# Patient Record
Sex: FEMALE | Race: WHITE | NOT HISPANIC OR LATINO | Employment: OTHER | URBAN - METROPOLITAN AREA
[De-identification: names, ages, dates, MRNs, and addresses within clinical notes are randomized per-mention and may not be internally consistent; named-entity substitution may affect disease eponyms.]

---

## 2021-08-07 ENCOUNTER — HOSPITAL ENCOUNTER (OUTPATIENT)
Facility: HOSPITAL | Age: 71
Discharge: HOME | End: 2021-08-08
Attending: EMERGENCY MEDICINE | Admitting: EMERGENCY MEDICINE
Payer: COMMERCIAL

## 2021-08-07 ENCOUNTER — APPOINTMENT (OUTPATIENT)
Dept: RADIOLOGY | Facility: HOSPITAL | Age: 71
End: 2021-08-07
Attending: EMERGENCY MEDICINE
Payer: COMMERCIAL

## 2021-08-07 DIAGNOSIS — J96.01 ACUTE HYPOXEMIC RESPIRATORY FAILURE (H): Primary | ICD-10-CM

## 2021-08-07 DIAGNOSIS — R09.02 HYPOXIA: ICD-10-CM

## 2021-08-07 DIAGNOSIS — J44.1 COPD EXACERBATION (H): ICD-10-CM

## 2021-08-07 PROBLEM — Z95.5 STENTED CORONARY ARTERY: Status: ACTIVE | Noted: 2019-05-17

## 2021-08-07 PROBLEM — F17.200 SMOKER: Status: ACTIVE | Noted: 2018-05-01

## 2021-08-07 PROBLEM — I25.5 ISCHEMIC CARDIOMYOPATHY: Status: ACTIVE | Noted: 2021-08-07

## 2021-08-07 PROBLEM — G47.33 OSA (OBSTRUCTIVE SLEEP APNEA): Status: ACTIVE | Noted: 2020-10-07

## 2021-08-07 PROBLEM — F31.0: Status: ACTIVE | Noted: 2020-02-25

## 2021-08-07 LAB
ANION GAP SERPL CALCULATED.3IONS-SCNC: 5 MMOL/L (ref 5–18)
BNP SERPL-MCNC: 152 PG/ML (ref 0–120)
BUN SERPL-MCNC: 8 MG/DL (ref 8–28)
C REACTIVE PROTEIN LHE: 0.3 MG/DL (ref 0–0.8)
CALCIUM SERPL-MCNC: 9.3 MG/DL (ref 8.5–10.5)
CHLORIDE BLD-SCNC: 108 MMOL/L (ref 98–107)
CO2 SERPL-SCNC: 28 MMOL/L (ref 22–31)
CREAT SERPL-MCNC: 0.81 MG/DL (ref 0.6–1.1)
ERYTHROCYTE [DISTWIDTH] IN BLOOD BY AUTOMATED COUNT: 13 % (ref 10–15)
GFR SERPL CREATININE-BSD FRML MDRD: 74 ML/MIN/1.73M2
GLUCOSE BLD-MCNC: 152 MG/DL (ref 70–125)
HCT VFR BLD AUTO: 43.2 % (ref 35–47)
HGB BLD-MCNC: 14 G/DL (ref 11.7–15.7)
HOLD SPECIMEN: NORMAL
HOLD SPECIMEN: NORMAL
MCH RBC QN AUTO: 30.4 PG (ref 26.5–33)
MCHC RBC AUTO-ENTMCNC: 32.4 G/DL (ref 31.5–36.5)
MCV RBC AUTO: 94 FL (ref 78–100)
PLATELET # BLD AUTO: 187 10E3/UL (ref 150–450)
POTASSIUM BLD-SCNC: 4.9 MMOL/L (ref 3.5–5)
RBC # BLD AUTO: 4.61 10E6/UL (ref 3.8–5.2)
SARS-COV-2 RNA RESP QL NAA+PROBE: NEGATIVE
SODIUM SERPL-SCNC: 141 MMOL/L (ref 136–145)
TROPONIN I SERPL-MCNC: 0.02 NG/ML (ref 0–0.29)
WBC # BLD AUTO: 6 10E3/UL (ref 4–11)

## 2021-08-07 PROCEDURE — 36415 COLL VENOUS BLD VENIPUNCTURE: CPT | Performed by: STUDENT IN AN ORGANIZED HEALTH CARE EDUCATION/TRAINING PROGRAM

## 2021-08-07 PROCEDURE — 93005 ELECTROCARDIOGRAM TRACING: CPT | Performed by: EMERGENCY MEDICINE

## 2021-08-07 PROCEDURE — 96375 TX/PRO/DX INJ NEW DRUG ADDON: CPT

## 2021-08-07 PROCEDURE — 86141 C-REACTIVE PROTEIN HS: CPT | Performed by: EMERGENCY MEDICINE

## 2021-08-07 PROCEDURE — 85027 COMPLETE CBC AUTOMATED: CPT | Performed by: EMERGENCY MEDICINE

## 2021-08-07 PROCEDURE — 93005 ELECTROCARDIOGRAM TRACING: CPT

## 2021-08-07 PROCEDURE — 250N000009 HC RX 250: Performed by: FAMILY MEDICINE

## 2021-08-07 PROCEDURE — 87635 SARS-COV-2 COVID-19 AMP PRB: CPT | Performed by: EMERGENCY MEDICINE

## 2021-08-07 PROCEDURE — 99285 EMERGENCY DEPT VISIT HI MDM: CPT | Mod: 25

## 2021-08-07 PROCEDURE — 93010 ELECTROCARDIOGRAM REPORT: CPT | Mod: HOP | Performed by: INTERNAL MEDICINE

## 2021-08-07 PROCEDURE — 80048 BASIC METABOLIC PNL TOTAL CA: CPT | Performed by: EMERGENCY MEDICINE

## 2021-08-07 PROCEDURE — 99220 PR INITIAL OBSERVATION CARE,LEVEL III: CPT | Performed by: FAMILY MEDICINE

## 2021-08-07 PROCEDURE — 83880 ASSAY OF NATRIURETIC PEPTIDE: CPT | Performed by: EMERGENCY MEDICINE

## 2021-08-07 PROCEDURE — 250N000011 HC RX IP 250 OP 636: Performed by: EMERGENCY MEDICINE

## 2021-08-07 PROCEDURE — 96374 THER/PROPH/DIAG INJ IV PUSH: CPT

## 2021-08-07 PROCEDURE — 84484 ASSAY OF TROPONIN QUANT: CPT | Performed by: EMERGENCY MEDICINE

## 2021-08-07 PROCEDURE — 120N000001 HC R&B MED SURG/OB

## 2021-08-07 PROCEDURE — 250N000009 HC RX 250: Performed by: EMERGENCY MEDICINE

## 2021-08-07 PROCEDURE — 94640 AIRWAY INHALATION TREATMENT: CPT

## 2021-08-07 PROCEDURE — C9803 HOPD COVID-19 SPEC COLLECT: HCPCS

## 2021-08-07 PROCEDURE — 71046 X-RAY EXAM CHEST 2 VIEWS: CPT

## 2021-08-07 PROCEDURE — 250N000011 HC RX IP 250 OP 636: Performed by: FAMILY MEDICINE

## 2021-08-07 PROCEDURE — 250N000013 HC RX MED GY IP 250 OP 250 PS 637: Performed by: FAMILY MEDICINE

## 2021-08-07 RX ORDER — SPIRONOLACTONE 25 MG/1
25 TABLET ORAL DAILY
Status: DISCONTINUED | OUTPATIENT
Start: 2021-08-08 | End: 2021-08-08 | Stop reason: HOSPADM

## 2021-08-07 RX ORDER — ALBUTEROL SULFATE 0.83 MG/ML
5 SOLUTION RESPIRATORY (INHALATION) ONCE
Status: COMPLETED | OUTPATIENT
Start: 2021-08-07 | End: 2021-08-07

## 2021-08-07 RX ORDER — NITROGLYCERIN 0.4 MG/1
0.4 TABLET SUBLINGUAL EVERY 5 MIN PRN
COMMUNITY

## 2021-08-07 RX ORDER — FAMOTIDINE 20 MG/1
20 TABLET, FILM COATED ORAL 2 TIMES DAILY
Status: DISCONTINUED | OUTPATIENT
Start: 2021-08-07 | End: 2021-08-08 | Stop reason: HOSPADM

## 2021-08-07 RX ORDER — SUCRALFATE 1 G/1
1 TABLET ORAL AT BEDTIME
Status: DISCONTINUED | OUTPATIENT
Start: 2021-08-07 | End: 2021-08-08 | Stop reason: HOSPADM

## 2021-08-07 RX ORDER — BUDESONIDE AND FORMOTEROL FUMARATE DIHYDRATE 80; 4.5 UG/1; UG/1
2 AEROSOL RESPIRATORY (INHALATION) 2 TIMES DAILY
Status: DISCONTINUED | OUTPATIENT
Start: 2021-08-07 | End: 2021-08-08 | Stop reason: HOSPADM

## 2021-08-07 RX ORDER — ACETAMINOPHEN 325 MG/1
325-650 TABLET ORAL EVERY 6 HOURS PRN
COMMUNITY

## 2021-08-07 RX ORDER — SOTALOL HYDROCHLORIDE 80 MG/1
80 TABLET ORAL 2 TIMES DAILY
COMMUNITY

## 2021-08-07 RX ORDER — ALBUTEROL SULFATE 90 UG/1
2 AEROSOL, METERED RESPIRATORY (INHALATION) EVERY 6 HOURS
COMMUNITY

## 2021-08-07 RX ORDER — CARVEDILOL 25 MG/1
25 TABLET ORAL 2 TIMES DAILY WITH MEALS
COMMUNITY

## 2021-08-07 RX ORDER — LANOLIN ALCOHOL/MO/W.PET/CERES
2000 CREAM (GRAM) TOPICAL DAILY
Status: DISCONTINUED | OUTPATIENT
Start: 2021-08-08 | End: 2021-08-08 | Stop reason: HOSPADM

## 2021-08-07 RX ORDER — EZETIMIBE 10 MG/1
10 TABLET ORAL DAILY
COMMUNITY

## 2021-08-07 RX ORDER — DEXTROSE MONOHYDRATE 25 G/50ML
25-50 INJECTION, SOLUTION INTRAVENOUS
Status: DISCONTINUED | OUTPATIENT
Start: 2021-08-07 | End: 2021-08-08 | Stop reason: HOSPADM

## 2021-08-07 RX ORDER — EZETIMIBE 10 MG/1
10 TABLET ORAL AT BEDTIME
Status: DISCONTINUED | OUTPATIENT
Start: 2021-08-07 | End: 2021-08-08 | Stop reason: HOSPADM

## 2021-08-07 RX ORDER — WARFARIN SODIUM 5 MG/1
5 TABLET ORAL SEE ADMIN INSTRUCTIONS
COMMUNITY

## 2021-08-07 RX ORDER — PANTOPRAZOLE SODIUM 40 MG/1
1 FOR SUSPENSION ORAL DAILY
COMMUNITY

## 2021-08-07 RX ORDER — FUROSEMIDE 10 MG/ML
20 INJECTION INTRAMUSCULAR; INTRAVENOUS ONCE
Status: COMPLETED | OUTPATIENT
Start: 2021-08-07 | End: 2021-08-07

## 2021-08-07 RX ORDER — LEVOTHYROXINE SODIUM 137 UG/1
137 TABLET ORAL DAILY
COMMUNITY

## 2021-08-07 RX ORDER — SPIRONOLACTONE 25 MG/1
25 TABLET ORAL DAILY
COMMUNITY

## 2021-08-07 RX ORDER — SOTALOL HYDROCHLORIDE 80 MG/1
80 TABLET ORAL 2 TIMES DAILY
Status: DISCONTINUED | OUTPATIENT
Start: 2021-08-07 | End: 2021-08-08 | Stop reason: HOSPADM

## 2021-08-07 RX ORDER — WARFARIN SODIUM 5 MG/1
5 TABLET ORAL SEE ADMIN INSTRUCTIONS
Status: DISCONTINUED | OUTPATIENT
Start: 2021-08-07 | End: 2021-08-07

## 2021-08-07 RX ORDER — NICOTINE POLACRILEX 4 MG
15-30 LOZENGE BUCCAL
Status: DISCONTINUED | OUTPATIENT
Start: 2021-08-07 | End: 2021-08-08 | Stop reason: HOSPADM

## 2021-08-07 RX ORDER — IPRATROPIUM BROMIDE AND ALBUTEROL SULFATE 2.5; .5 MG/3ML; MG/3ML
1 SOLUTION RESPIRATORY (INHALATION) EVERY 6 HOURS PRN
COMMUNITY

## 2021-08-07 RX ORDER — ATORVASTATIN CALCIUM 40 MG/1
40 TABLET, FILM COATED ORAL AT BEDTIME
Status: DISCONTINUED | OUTPATIENT
Start: 2021-08-07 | End: 2021-08-08 | Stop reason: HOSPADM

## 2021-08-07 RX ORDER — ALBUTEROL SULFATE 0.83 MG/ML
2.5 SOLUTION RESPIRATORY (INHALATION)
Status: DISCONTINUED | OUTPATIENT
Start: 2021-08-07 | End: 2021-08-08 | Stop reason: HOSPADM

## 2021-08-07 RX ORDER — VITAMIN B COMPLEX
50 TABLET ORAL DAILY
Status: DISCONTINUED | OUTPATIENT
Start: 2021-08-08 | End: 2021-08-08 | Stop reason: HOSPADM

## 2021-08-07 RX ORDER — DULOXETIN HYDROCHLORIDE 20 MG/1
20 CAPSULE, DELAYED RELEASE ORAL DAILY
COMMUNITY

## 2021-08-07 RX ORDER — QUETIAPINE FUMARATE 100 MG/1
400 TABLET, FILM COATED ORAL AT BEDTIME
Status: DISCONTINUED | OUTPATIENT
Start: 2021-08-07 | End: 2021-08-08 | Stop reason: HOSPADM

## 2021-08-07 RX ORDER — NITROGLYCERIN 0.4 MG/1
0.4 TABLET SUBLINGUAL EVERY 5 MIN PRN
Status: DISCONTINUED | OUTPATIENT
Start: 2021-08-07 | End: 2021-08-08 | Stop reason: HOSPADM

## 2021-08-07 RX ORDER — LISINOPRIL 5 MG/1
10 TABLET ORAL DAILY
COMMUNITY

## 2021-08-07 RX ORDER — BUDESONIDE AND FORMOTEROL FUMARATE DIHYDRATE 80; 4.5 UG/1; UG/1
2 AEROSOL RESPIRATORY (INHALATION) 2 TIMES DAILY
COMMUNITY

## 2021-08-07 RX ORDER — OXYBUTYNIN CHLORIDE 10 MG/1
10 TABLET, EXTENDED RELEASE ORAL DAILY
Status: DISCONTINUED | OUTPATIENT
Start: 2021-08-07 | End: 2021-08-08 | Stop reason: HOSPADM

## 2021-08-07 RX ORDER — ZOLPIDEM TARTRATE 5 MG/1
5 TABLET ORAL
COMMUNITY

## 2021-08-07 RX ORDER — OXYBUTYNIN CHLORIDE 10 MG/1
10 TABLET, EXTENDED RELEASE ORAL DAILY
COMMUNITY

## 2021-08-07 RX ORDER — ATORVASTATIN CALCIUM 40 MG/1
40 TABLET, FILM COATED ORAL DAILY
COMMUNITY

## 2021-08-07 RX ORDER — LISINOPRIL 5 MG/1
10 TABLET ORAL DAILY
Status: DISCONTINUED | OUTPATIENT
Start: 2021-08-08 | End: 2021-08-08 | Stop reason: HOSPADM

## 2021-08-07 RX ORDER — PREDNISONE 20 MG/1
20 TABLET ORAL 2 TIMES DAILY WITH MEALS
Status: DISCONTINUED | OUTPATIENT
Start: 2021-08-08 | End: 2021-08-08 | Stop reason: HOSPADM

## 2021-08-07 RX ORDER — LEVOTHYROXINE SODIUM 137 UG/1
137 TABLET ORAL
Status: DISCONTINUED | OUTPATIENT
Start: 2021-08-08 | End: 2021-08-08 | Stop reason: HOSPADM

## 2021-08-07 RX ORDER — PANTOPRAZOLE SODIUM 20 MG/1
40 TABLET, DELAYED RELEASE ORAL DAILY
Status: DISCONTINUED | OUTPATIENT
Start: 2021-08-08 | End: 2021-08-08 | Stop reason: HOSPADM

## 2021-08-07 RX ORDER — ACETAMINOPHEN 325 MG/1
325-650 TABLET ORAL EVERY 6 HOURS PRN
Status: DISCONTINUED | OUTPATIENT
Start: 2021-08-07 | End: 2021-08-08 | Stop reason: HOSPADM

## 2021-08-07 RX ORDER — CARVEDILOL 12.5 MG/1
25 TABLET ORAL 2 TIMES DAILY WITH MEALS
Status: DISCONTINUED | OUTPATIENT
Start: 2021-08-07 | End: 2021-08-08 | Stop reason: HOSPADM

## 2021-08-07 RX ORDER — QUETIAPINE FUMARATE 400 MG/1
400 TABLET, FILM COATED ORAL AT BEDTIME
COMMUNITY

## 2021-08-07 RX ORDER — SUCRALFATE 1 G/1
1 TABLET ORAL DAILY
COMMUNITY

## 2021-08-07 RX ORDER — LIDOCAINE 40 MG/G
CREAM TOPICAL
Status: DISCONTINUED | OUTPATIENT
Start: 2021-08-07 | End: 2021-08-08 | Stop reason: HOSPADM

## 2021-08-07 RX ORDER — DULOXETIN HYDROCHLORIDE 20 MG/1
20 CAPSULE, DELAYED RELEASE ORAL DAILY
Status: DISCONTINUED | OUTPATIENT
Start: 2021-08-08 | End: 2021-08-08 | Stop reason: HOSPADM

## 2021-08-07 RX ORDER — ZOLPIDEM TARTRATE 5 MG/1
5 TABLET ORAL
Status: DISCONTINUED | OUTPATIENT
Start: 2021-08-07 | End: 2021-08-08 | Stop reason: HOSPADM

## 2021-08-07 RX ORDER — METHYLPREDNISOLONE SODIUM SUCCINATE 125 MG/2ML
125 INJECTION, POWDER, LYOPHILIZED, FOR SOLUTION INTRAMUSCULAR; INTRAVENOUS ONCE
Status: COMPLETED | OUTPATIENT
Start: 2021-08-07 | End: 2021-08-07

## 2021-08-07 RX ORDER — FAMOTIDINE 20 MG/1
20 TABLET, FILM COATED ORAL 2 TIMES DAILY
COMMUNITY

## 2021-08-07 RX ADMIN — ALBUTEROL SULFATE 5 MG: 2.5 SOLUTION RESPIRATORY (INHALATION) at 13:42

## 2021-08-07 RX ADMIN — QUETIAPINE FUMARATE 400 MG: 100 TABLET, FILM COATED ORAL at 21:08

## 2021-08-07 RX ADMIN — Medication 1 MG: at 21:08

## 2021-08-07 RX ADMIN — SOTALOL HYDROCHLORIDE 80 MG: 80 TABLET ORAL at 20:12

## 2021-08-07 RX ADMIN — ALBUTEROL SULFATE 5 MG: 2.5 SOLUTION RESPIRATORY (INHALATION) at 17:37

## 2021-08-07 RX ADMIN — BUDESONIDE AND FORMOTEROL FUMARATE DIHYDRATE 2 PUFF: 80; 4.5 AEROSOL RESPIRATORY (INHALATION) at 20:14

## 2021-08-07 RX ADMIN — CARVEDILOL 25 MG: 12.5 TABLET, FILM COATED ORAL at 18:20

## 2021-08-07 RX ADMIN — FAMOTIDINE 20 MG: 20 TABLET, FILM COATED ORAL at 20:13

## 2021-08-07 RX ADMIN — ATORVASTATIN CALCIUM 40 MG: 40 TABLET, FILM COATED ORAL at 20:13

## 2021-08-07 RX ADMIN — EZETIMIBE 10 MG: 10 TABLET ORAL at 20:12

## 2021-08-07 RX ADMIN — SUCRALFATE 1 G: 1 TABLET ORAL at 20:12

## 2021-08-07 RX ADMIN — METHYLPREDNISOLONE SODIUM SUCCINATE 125 MG: 125 INJECTION, POWDER, FOR SOLUTION INTRAMUSCULAR; INTRAVENOUS at 13:59

## 2021-08-07 RX ADMIN — FUROSEMIDE 20 MG: 10 INJECTION, SOLUTION INTRAMUSCULAR; INTRAVENOUS at 18:20

## 2021-08-07 RX ADMIN — ZOLPIDEM TARTRATE 5 MG: 5 TABLET ORAL at 21:08

## 2021-08-07 ASSESSMENT — MIFFLIN-ST. JEOR: SCORE: 1611.77

## 2021-08-07 ASSESSMENT — ACTIVITIES OF DAILY LIVING (ADL): DEPENDENT_IADLS:: INDEPENDENT

## 2021-08-07 NOTE — PHARMACY-ANTICOAGULATION SERVICE
Clinical Pharmacy - Warfarin Dosing Consult     Pharmacy has been consulted to manage this patient s warfarin therapy.  Indication: Atrial Fibrillation  Therapy Goal: INR 2-3  Provider/Team: /Hospitalist  Warfarin Prior to Admission: Yes  Warfarin PTA Regimen: Take 5 mg on Sunday and Tuesday and 7.5 mg all other days  Recent documented change in oral intake/nutrition: Unknown  Dose Comments: No INR today but dose already taken PTA. INR ordered for tomorrow    No results found for: INR, [id number], F2    Warfarin dose has already been taken tonight prior to admission.   Pharmacy will monitor Fatemeh Pablo daily and order warfarin doses to achieve specified goal.      Please contact pharmacy as soon as possible if the warfarin needs to be held for a procedure or if the warfarin goals change.

## 2021-08-07 NOTE — H&P
[facility]    History and Physical - Hospitalist Service       Date of Admission:  8/7/2021    Assessment & Plan      Fatemeh Pablo is a 70 year old female admitted on 8/7/2021. She presents with recurrence of shortness of breath and coughing since her recent hospitalization for COPD exacerbation.  She has not been able to follow-up with pulmonology.  She is not on prednisone.  And her albuterol does not alleviate symptoms for more than 2 hours.    Acute COPD exacerbation: Solu-Medrol given in ED, will start prednisone 20 mg p.o. twice daily she should likely taper this slowly until she gets to her outpatient follow-up with pulmonology on the 27th.  Continue to offer albuterol nebs every 2 hours as needed.  She discussed need for home oxygen but I explained that she can likely control symptoms with home prednisone until follow-up.    Ischemic cardiomyopathy: She describes her symptoms of orthopnea and PND.  Troponin is negative.  -With ejection fraction recently measured below 35%.  Patient has automatic defibrillator.  -We will give 1 dose of Lasix overnight and monitor for improvement of symptoms.    Atrial fibrillation: Patient is currently rate controlled on Coumadin.    Type 2 diabetes: We will start sliding scale while she is getting prednisone.    Bipolar disorder: Continue home medications.    Obesity:Body mass index is 34.97 kg/m .       Diet:  Diabetic diet  DVT Prophylaxis: Warfarin  Moya Catheter: Not present  Central Lines: None  Code Status:  Full code      Disposition Plan   Expected discharge: recommended to prior living arrangement once O2 use less than 1 liters/minute.     The patient's care was discussed with the Bedside Nurse and Patient.    Willem Lemus MD  [facility]  Securely message with the Vocera Web Console (learn more here)  Text page via [employer]  Paging/Directory      ______________________________________________________________________    Chief Complaint   Dyspnea cough    History is obtained from the patient    History of Present Illness   Fatemeh Pablo is a 70 year old female with a pertinent history of COPD, diabetes mellitus II,  who presents to this ED via EMS for evaluation of shortness of breath.      Patient presents with shortness of breath that has been worsening for the past 3 days. The shortness of breath has gotten to the point where she can't walk to the bathroom or speak without becoming short of breath. She has an associated cough and laying flat tends to exacerbates the shortness of breath. Patient was discharged from [facility] a couple of weeks ago after being treated for pneumonia. She has since been using albuterol breathing treatments as needed which helped initially. However, over the past 1-2 days the breathing treatments have not provided any improvement. Her home O2 stats dropped to 87% over the past few days. She requested supplemental home oxygen at her previous hospitalization however, her insurance would not cover this. Patient's most recent EF was at 25% and she was told she has COPD. She does continue to smoke tobacco and she is vaccinated for COVID-19.   Denies chest pain, chest pressure, lower extremity pain or swelling, or any additional symptoms at this time    Review of Systems    The 10 point Review of Systems is negative other than noted in the HPI or here.     Past Medical History    I have reviewed this patient's medical history and updated it with pertinent information if needed.   No past medical history on file.    Past Surgical History   I have reviewed this patient's surgical history and updated it with pertinent information if needed.  No past surgical history on file.    Social History   I have reviewed this patient's social history and updated it with pertinent information if needed.  Social History      Tobacco Use     Smoking status: Not on file   Substance Use Topics     Alcohol use: Not on file     Drug use: Not on file       Family History   No significant family history, including no history of: Medical conditions relevant to CC    Prior to Admission Medications   Prior to Admission Medications   Prescriptions Last Dose Informant Patient Reported? Taking?   DULoxetine (CYMBALTA) 20 MG capsule 8/7/2021 at am  Yes Yes   Sig: Take 20 mg by mouth daily    QUEtiapine (SEROQUEL) 400 MG tablet 8/6/2021 at pm  Yes Yes   Sig: Take 400 mg by mouth At Bedtime   acetaminophen (TYLENOL) 325 MG tablet Past Week at Unknown time  Yes Yes   Sig: Take 325-650 mg by mouth every 6 hours as needed for mild pain   albuterol (PROAIR HFA/PROVENTIL HFA/VENTOLIN HFA) 108 (90 Base) MCG/ACT inhaler Past Week at Unknown time  Yes Yes   Sig: Inhale 2 puffs into the lungs every 6 hours   atorvastatin (LIPITOR) 40 MG tablet 8/6/2021 at pm  Yes Yes   Sig: Take 40 mg by mouth daily   budesonide-formoterol (SYMBICORT) 80-4.5 MCG/ACT Inhaler 8/6/2021 at pm  Yes Yes   Sig: Inhale 2 puffs into the lungs 2 times daily   carvedilol (COREG) 25 MG tablet 8/7/2021 at am  Yes Yes   Sig: Take 25 mg by mouth 2 times daily (with meals)   cholecalciferol (VITAMIN D3) 25 mcg (1000 units) capsule 8/7/2021 at am  Yes Yes   Sig: Take 2 capsules by mouth daily   ezetimibe (ZETIA) 10 MG tablet 8/6/2021 at pm  Yes Yes   Sig: Take 10 mg by mouth daily   famotidine (PEPCID) 20 MG tablet 8/7/2021 at am  Yes Yes   Sig: Take 20 mg by mouth 2 times daily   ipratropium - albuterol 0.5 mg/2.5 mg/3 mL (DUONEB) 0.5-2.5 (3) MG/3ML neb solution 8/7/2021 at am  Yes Yes   Sig: Take 1 vial by nebulization every 6 hours as needed for shortness of breath / dyspnea or wheezing   levothyroxine (SYNTHROID/LEVOTHROID) 137 MCG tablet 8/7/2021 at am  Yes Yes   Sig: Take 137 mcg by mouth daily   lisinopril (ZESTRIL) 5 MG tablet 8/7/2021 at am  Yes Yes   Sig: Take 10 mg by mouth daily    nitroGLYcerin (NITROSTAT) 0.4 MG sublingual tablet Past Week at Unknown time  Yes Yes   Sig: Place 0.4 mg under the tongue every 5 minutes as needed for chest pain For chest pain place 1 tablet under the tongue every 5 minutes for 3 doses. If symptoms persist 5 minutes after 1st dose call 911.   oxybutynin ER (DITROPAN-XL) 10 MG 24 hr tablet 8/7/2021 at am  Yes Yes   Sig: Take 10 mg by mouth daily   pantoprazole sodium (PROTONIX) 40 MG packet 8/7/2021 at am  Yes Yes   Sig: Take 1 packet by mouth daily   sotalol (BETAPACE) 80 MG tablet 8/7/2021 at am  Yes Yes   Sig: Take 80 mg by mouth 2 times daily   spironolactone (ALDACTONE) 25 MG tablet 8/7/2021 at am  Yes Yes   Sig: Take 25 mg by mouth daily   sucralfate (CARAFATE) 1 GM tablet 8/6/2021 at pm  Yes Yes   Sig: Take 1 g by mouth daily   vitamin B-12 (CYANOCOBALAMIN) 2000 MCG tablet 8/7/2021 at am  Yes Yes   Sig: Take 2,000 mcg by mouth daily   warfarin ANTICOAGULANT (COUMADIN) 5 MG tablet 8/7/2021 at pm  Yes Yes   Sig: Take 5 mg by mouth See Admin Instructions Take 5 mg on Sunday and Tuesday and 7.5 mg all other days   zolpidem (AMBIEN) 5 MG tablet 8/6/2021 at pm  Yes Yes   Sig: Take 5 mg by mouth nightly as needed for sleep      Facility-Administered Medications: None     Allergies   No Known Allergies    Physical Exam   Vital Signs: Temp: 98  F (36.7  C) Temp src: Oral BP: (!) 144/63 Pulse: 64   Resp: 28 SpO2: 96 % O2 Device: Nasal cannula Oxygen Delivery: 3 LPM  Weight: 230 lbs 0 oz    General Appearance: Alert dyspneic  Eyes: Anicteric  HEENT: Membranes dry  Respiratory: Diffuse rhonchi and expiratory wheezes  Cardiovascular: Regular rate and rhythm  GI: Bowel sounds present  Lymph/Hematologic: Nonpallorous  Genitourinary: Not examined  Skin: Pale  Musculoskeletal: No joint swelling  Neurologic: Moves upper and lower extremities equally no tremor  Psychiatric: Thought content and orders normal.    Data   Data reviewed today: I reviewed all medications, new  labs and imaging results over the last 24 hours. I personally reviewed EKG and chest imaging  Recent Labs   Lab 08/07/21  1247   WBC 6.0   HGB 14.0   MCV 94         POTASSIUM 4.9   CHLORIDE 108*   CO2 28   BUN 8   CR 0.81   ANIONGAP 5   LAURITA 9.3   *     Most Recent 3 CBC's:Recent Labs   Lab Test 08/07/21  1247   WBC 6.0   HGB 14.0   MCV 94        Recent Results (from the past 24 hour(s))   XR Chest 2 Views    Narrative    EXAM: XR CHEST 2 VW  LOCATION: [facility]  DATE/TIME: 8/7/2021 1:05 PM    INDICATION: Dyspnea.  COMPARISON: 6/10/2021.      Impression    IMPRESSION: No change in dual-lead AICD. Heart and mediastinal size are normal. Stable scarring within the lingula. No new airspace opacities. No pleural effusion or pneumothorax.

## 2021-08-07 NOTE — CONSULTS
"Care Management Initial Consult    General Information  Assessment completed with: Fatemeh Michelle  Type of CM/SW Visit: Initial Assessment    Primary Care Provider verified and updated as needed: Yes   Readmission within the last 30 days: previous discharge plan unsuccessful (Admitted to [facility] on 7/14/21.)   Return Category: Exacerbation of disease  Reason for Consult: discharge planning  Advance Care Planning: Advance Care Planning Reviewed: other (comment) (\"I don't have one\")          Communication Assessment  Patient's communication style: spoken language (English or Bilingual)    Hearing Difficulty or Deaf: no   Wear Glasses or Blind: no    Cognitive  Cognitive/Neuro/Behavioral: WDL                      Living Environment:   People in home: alone     Current living Arrangements: apartment      Able to return to prior arrangements: yes       Family/Social Support:  Care provided by: self  Provides care for: no one  Marital Status:   Children          Description of Support System:           Current Resources:   Patient receiving home care services: No     Community Resources: None  Equipment currently used at home: none  Supplies currently used at home: Nebulizer tubing, Other (\"glasses\")    Employment/Financial:  Employment Status: retired        Financial Concerns:     Referral to Financial Counselor: No       Lifestyle & Psychosocial Needs:  Social Determinants of Health     Tobacco Use:      Smoking Tobacco Use:      Smokeless Tobacco Use:    Alcohol Use:      Frequency of Alcohol Consumption:      Average Number of Drinks:      Frequency of Binge Drinking:    Financial Resource Strain:      Difficulty of Paying Living Expenses:    Food Insecurity:      Worried About Running Out of Food in the Last Year:      Ran Out of Food in the Last Year:    Transportation Needs:      Lack of Transportation (Medical):      Lack of Transportation (Non-Medical):    Physical Activity:      Days of Exercise per " "Week:      Minutes of Exercise per Session:    Stress:      Feeling of Stress :    Social Connections:      Frequency of Communication with Friends and Family:      Frequency of Social Gatherings with Friends and Family:      Attends Spiritism Services:      Active Member of Clubs or Organizations:      Attends Club or Organization Meetings:      Marital Status:    Intimate Partner Violence:      Fear of Current or Ex-Partner:      Emotionally Abused:      Physically Abused:      Sexually Abused:    Depression:      PHQ-2 Score:    Housing Stability:      Unable to Pay for Housing in the Last Year:      Number of Places Lived in the Last Year:      Unstable Housing in the Last Year:        Functional Status:  Prior to admission patient needed assistance:   Dependent ADLs::  (see notes for ambulation)  Dependent IADLs:: Independent  Assesssment of Functional Status: Not at baseline with mobility    Mental Health Status:          Chemical Dependency Status:                Values/Beliefs:  Spiritual, Cultural Beliefs, Spiritism Practices, Values that affect care:                 Additional Information:  Fatemeh lives in an apartment alone. She is independent with ADLs at baseline.    She states, \"I was just at [facility] and admitted 7/14/21. They did an O2 eval and I passed on room air so they did not order oxygen at home and I know that is why I am back. I need oxygen at home.\"    She \"does not have a walker or cane at home. With the increased trouble breathing I have been pushing around a shopping cart in the hays at my apartment building to be able to walk around\".    May need Home O2 if she qualifies.    Family to transport at discharge.    Jen Johnson RN      "

## 2021-08-07 NOTE — PHARMACY-ADMISSION MEDICATION HISTORY
Pharmacy Note - Admission Medication History    Pertinent Provider Information: None     ______________________________________________________________________    Prior To Admission (PTA) med list completed and updated in EMR.       PTA Med List   Medication Sig Last Dose     acetaminophen (TYLENOL) 325 MG tablet Take 325-650 mg by mouth every 6 hours as needed for mild pain Past Week at Unknown time     albuterol (PROAIR HFA/PROVENTIL HFA/VENTOLIN HFA) 108 (90 Base) MCG/ACT inhaler Inhale 2 puffs into the lungs every 6 hours Past Week at Unknown time     atorvastatin (LIPITOR) 40 MG tablet Take 40 mg by mouth daily 8/6/2021 at pm     budesonide-formoterol (SYMBICORT) 80-4.5 MCG/ACT Inhaler Inhale 2 puffs into the lungs 2 times daily 8/6/2021 at pm     carvedilol (COREG) 25 MG tablet Take 25 mg by mouth 2 times daily (with meals) 8/7/2021 at am     cholecalciferol (VITAMIN D3) 25 mcg (1000 units) capsule Take 2 capsules by mouth daily 8/7/2021 at am     DULoxetine (CYMBALTA) 20 MG capsule Take 20 mg by mouth daily  8/7/2021 at am     ezetimibe (ZETIA) 10 MG tablet Take 10 mg by mouth daily 8/6/2021 at pm     famotidine (PEPCID) 20 MG tablet Take 20 mg by mouth 2 times daily 8/7/2021 at am     ipratropium - albuterol 0.5 mg/2.5 mg/3 mL (DUONEB) 0.5-2.5 (3) MG/3ML neb solution Take 1 vial by nebulization every 6 hours as needed for shortness of breath / dyspnea or wheezing 8/7/2021 at am     levothyroxine (SYNTHROID/LEVOTHROID) 137 MCG tablet Take 137 mcg by mouth daily 8/7/2021 at am     lisinopril (ZESTRIL) 5 MG tablet Take 10 mg by mouth daily 8/7/2021 at am     nitroGLYcerin (NITROSTAT) 0.4 MG sublingual tablet Place 0.4 mg under the tongue every 5 minutes as needed for chest pain For chest pain place 1 tablet under the tongue every 5 minutes for 3 doses. If symptoms persist 5 minutes after 1st dose call 911. Past Week at Unknown time     oxybutynin ER (DITROPAN-XL) 10 MG 24 hr tablet Take 10 mg by mouth daily  8/7/2021 at am     pantoprazole sodium (PROTONIX) 40 MG packet Take 1 packet by mouth daily 8/7/2021 at am     QUEtiapine (SEROQUEL) 400 MG tablet Take 400 mg by mouth At Bedtime 8/6/2021 at pm     sotalol (BETAPACE) 80 MG tablet Take 80 mg by mouth 2 times daily 8/7/2021 at am     spironolactone (ALDACTONE) 25 MG tablet Take 25 mg by mouth daily 8/7/2021 at am     sucralfate (CARAFATE) 1 GM tablet Take 1 g by mouth daily 8/6/2021 at pm     vitamin B-12 (CYANOCOBALAMIN) 2000 MCG tablet Take 2,000 mcg by mouth daily 8/7/2021 at am     warfarin ANTICOAGULANT (COUMADIN) 5 MG tablet Take 5 mg by mouth See Admin Instructions Take 5 mg on Sunday and Tuesday and 7.5 mg all other days 8/7/2021 at pm     zolpidem (AMBIEN) 5 MG tablet Take 5 mg by mouth nightly as needed for sleep 8/6/2021 at pm       Information source(s): Patient and [facility]  Method of interview communication: in-person    Summary of Changes to PTA Med List  New: ALL    Patient was asked about OTC/herbal products specifically.  PTA med list reflects this.    In the past week, patient estimated taking medication this percent of the time:  greater than 90%.    Allergies were reviewed, assessed, and updated with the patient.      Medications available for use during hospital stay: albuterol inhaler (Symbicort is NOT available from home).     The information provided in this note is only as accurate as the sources available at the time of the update(s).    Thank you for the opportunity to participate in the care of this patient.    Ree Aguilera  8/7/2021 3:18 PM

## 2021-08-07 NOTE — ED TRIAGE NOTES
Pt dx with pneumonia 2.5 weeks ago with cough.  Pt discharge.  Pt discharge 2 weeks ago.  Pt state sats 94% at home and have been dropping the past few days to 88%. Pt has not smoked x 2 weeks. Pt sats in triage 91%.  Pt put on 2L/NC in triage,  Sats 94%.

## 2021-08-07 NOTE — ED PROVIDER NOTES
eMERGENCY dEPARTMENT PROGRESS NOTE         ED COURSE AND MEDICAL DECISION MAKING  Patient was signed out to me by Dr. Starr at 4:13 PM      Fatemeh Pablo is a 70 year old female who presents with shortness of breath consistent with prior COPD exacerbations.  She had no significant abnormalities on her chest x-ray or lab imaging.  She is borderline hypoxic.  She will be brought into the hospital.  Dr. Lemus is the admitting physician and agreed with the plan.    LAB  Pertinent labs results reviewed   Labs Ordered and Resulted from Time of ED Arrival Up to the Time of Departure from the ED   BASIC METABOLIC PANEL - Abnormal; Notable for the following components:       Result Value    Chloride 108 (*)     Glucose 152 (*)     All other components within normal limits   CBC WITH PLATELETS - Normal   TROPONIN I - Normal   CRP INFLAMMATION - Normal   SARS-COV2 (COVID-19) VIRUS RT-PCR - Normal    Narrative:     Testing was performed using the ke  SARS-CoV-2 & Influenza A/B Assay on the ke  [employer]  System.  This test should be ordered for the detection of SARS-COV-2 in individuals who meet SARS-CoV-2 clinical and/or epidemiological criteria. Test performance is unknown in asymptomatic patients.  This test is for in vitro diagnostic use under the FDA EUA for laboratories certified under CLIA to perform moderate and/or high complexity testing. This test has not been FDA cleared or approved.  A negative test does not rule out the presence of PCR inhibitors in the specimen or target RNA in concentration below the limit of detection for the assay. The possibility of a false negative should be considered if the patient's recent exposure or clinical presentation suggests COVID-19.  [facility] are certified under the Clinical Laboratory Improvement Amendments of 1988 (CLIA-88) as qualified to perform moderate and/or high complexity laboratory testing.   EXTRA BLUE TOP TUBE   EXTRA RED TOP TUBE   B-TYPE  NATRIURETIC PEPTIDE ([facility] ONLY)   PERIPHERAL IV CATHETER   PULSE OXIMETRY NURSING   CARDIAC CONTINUOUS MONITORING   CALL   COVID-19 VIRUS (CORONAVIRUS) BY PCR    Narrative:     The following orders were created for panel order Symptomatic COVID-19 Virus (Coronavirus) by PCR Nasopharyngeal.  Procedure                               Abnormality         Status                     ---------                               -----------         ------                     SARS-COV2 (COVID-19) Vir...[[id number]]  Normal              Final result                 Please view results for these tests on the individual orders.   EXTRA TUBE    Narrative:     The following orders were created for panel order [place] Draw.  Procedure                               Abnormality         Status                     ---------                               -----------         ------                     Extra Blue Top Tube[[id number]]                              Final result               Extra Red Top Tube[[id number]]                               Final result                 Please view results for these tests on the individual orders.     RADIOLOGY    Pertinent imaging reviewed   Please see official radiology report.  XR Chest 2 Views   Final Result   IMPRESSION: No change in dual-lead AICD. Heart and mediastinal size are normal. Stable scarring within the lingula. No new airspace opacities. No pleural effusion or pneumothorax.          FINAL IMPRESSION    1. COPD exacerbation (H)    2. Hypoxia            Christina Verdugo MD  08/07/21 7182

## 2021-08-07 NOTE — ED NOTES
"[facility] ED Handoff Report    ED Chief Complaint:  chief complaint    ED Diagnosis:  (J44.1) COPD exacerbation (H)    (R09.02) Hypoxia         PMH:  No past medical history on file.     Code Status:  No Order     Falls Risk: No    Current Living Situation/Residence: apartment alone independent      Elimination Status:  continent    Activity Level:  independent    Patients Preferred Language:  English     Needed: No      Vital Signs:  BP (!) 147/81   Pulse 85   Temp 98  F (36.7  C) (Oral)   Resp 20   Ht 1.727 m (5' 8\")   Wt 104.3 kg (230 lb)   SpO2 92%   BMI 34.97 kg/m       Cardiac Rhythm: left BBB    Pain Score:  0/10    Is the Patient Confused:  No    Last Food or Drink: Today    Focused Assessment:  Respiratory, expiratory wheezes. SOB increased with activity. Lightheaded and bilateral leg weakness when SOB.     Tests Performed:  EKG/Nebs/Rest/Imaging    Abnormal Results:    Abnormal Labs Reviewed   BASIC METABOLIC PANEL - Abnormal; Notable for the following components:       Result Value    Chloride 108 (*)     Glucose 152 (*)     All other components within normal limits       XR Chest 2 Views   Final Result   IMPRESSION: No change in dual-lead AICD. Heart and mediastinal size are normal. Stable scarring within the lingula. No new airspace opacities. No pleural effusion or pneumothorax.           Treatments Provided:  Nebs O2 2LPM via NC    Family Dynamics/Concerns: no    Family Updated On Visitor Policy: Yes    Plan of Care Communicated to Family: Yes    Who Was Updated about Plan of Care: pt updated her son    Belongings Checklist Done and Signed by Patient: Yes    Symptomatic     ED Medications:    Medications   albuterol (PROVENTIL) neb solution 5 mg (5 mg Nebulization Given 8/7/21 1342)   methylPREDNISolone sodium succinate (solu-MEDROL) injection 125 mg (125 mg Intravenous Given 8/7/21 1359)   albuterol (PROVENTIL) neb solution 5 mg (5 mg Nebulization Given 8/7/21 1737)    "     Additional Information: pt stats SOB increased when her legs are raised. Often will dangle legs over edge of bed.       Polly Garza RN  8/7/2021 5:43 PM

## 2021-08-07 NOTE — ED PROVIDER NOTES
EMERGENCY DEPARTMENT ENCOUNTER      NAME: Fatemeh Pablo  AGE: 70 year old female  YOB: 1950  MRN: [id number]  EVALUATION DATE & TIME: 2021 12:56 PM    PCP: No primary care provider on file.    ED PROVIDER: Lo Starr D.O.      CHIEF COMPLAINT:  Chief Complaint   Patient presents with     Shortness of Breath         FINAL IMPRESSION:  1. COPD exacerbation (H)    2. Hypoxia          ED COURSE & MEDICAL DECISION MAKIN year old female with history of CHF and COPD presented to the ED for evaluation of shortness of breath has been progressively worsening over the last 3 days.  Patient states that she is unable to walk to the bathroom or even speak without becoming short of breath.  She denied any associated chest pain or chest pressure.  The patient had been using breathing treatments at home with little to no relief.  The patient reported that her O2 sats were 88% on room air at home.  Upon arrival to the ED the patient was noted to be hemodynamically stable.  At the time for initial evaluation the patient appeared to be in mild respiratory distress.  She had tachypnea, conversational dyspnea, and bilateral inspiratory and expiratory wheezing noted.  The remainder of her physical exam was unremarkable.    Following her initial evaluation the patient was given an albuterol breathing treatment as well as IV methylprednisolone.     The EKG shows an atrial paced rhythm with a left bundle block.  There is no previous EKG available for comparison.  CBC, BMP, troponin, and CRP were all reassuring.  Covid testing was negative.  The chest x-ray was nondiagnostic.    The patient was reevaluated informed of the reassuring lab, EKG, and chest x-ray results.  The patient reported minimal improvement with the albuterol breathing treatment.  The patient was still noted to have conversational dyspnea and mild tachypnea at the time reevaluation.      The patient will need admission for further evaluation  and possible pulmonology consultation for a COPD exacerbation.  The patient's care was turned over to Dr. Verdugo while awaiting admission.      1:12 PM I met with the patient, obtained history, performed an initial exam, and discussed options and plan for diagnostics and treatment here in the ED. Proper PPE was worn during the entire patient encounter, including: N95 mask, goggles, surgical cap, and gloves.       At the conclusion of the encounter I discussed the results of all of the tests and the disposition. The questions were answered. The patient or family acknowledged understanding and was agreeable with the care plan.     MEDICATIONS GIVEN IN THE EMERGENCY:  Medications   albuterol (PROVENTIL) neb solution 5 mg (5 mg Nebulization Given 8/7/21 1342)   methylPREDNISolone sodium succinate (solu-MEDROL) injection 125 mg (125 mg Intravenous Given 8/7/21 1359)       NEW PRESCRIPTIONS STARTED AT TODAY'S ER VISIT:  New Prescriptions    No medications on file          =================================================================    HPI  Fatemeh Pablo is a 70 year old female with a pertinent history of COPD, diabetes mellitus II,  who presents to this ED via EMS for evaluation of shortness of breath.     Patient presents with shortness of breath that has been worsening for the past 3 days. The shortness of breath has gotten to the point where she can't walk to the bathroom or speak without becoming short of breath. She has an associated cough and laying flat tends to exacerbates the shortness of breath. Patient was discharged from [facility] a couple of weeks ago after being treated for pneumonia. She has since been using albuterol breathing treatments as needed which helped initially. However, over the past 1-2 days the breathing treatments have not provided any improvement. Her home O2 stats dropped to 87% over the past few days. She requested supplemental home oxygen at her previous hospitalization however, her  insurance would not cover this. Patient's most recent EF was at 25% and she was told she has COPD. She does continue to smoke tobacco and she is vaccinated for COVID-19.   Denies chest pain, chest pressure, lower extremity pain or swelling, or any additional symptoms at this time.    I, Ange Reyna am serving as a scribe to document services personally performed by Dr. Lo Starr DO, based on my observation and the provider's statements to me. I, Dr. Lo Starr DO attest that Ange Reyna is acting in a scribe capacity, has observed my performance of the services and has documented them in accordance with my direction.      REVIEW OF SYSTEMS   Constitutional: Denies fever, chills, unintentional weight loss or fatigue   Eyes: Denies visual changes or discharge    HENT: Denies sore throat, ear pain or neck pain  Respiratory: Positive for cough and shortness of breath    Cardiovascular: Denies chest pain, palpitations or leg swelling  GI: Denies abdominal pain, nausea, vomiting, or dark, bloody stools.    : Denies hematuria, dysuria, or flank pain  Musculoskeletal: Denies any new back pain or new muscle/joint pains  Skin: Denies rash or wound  Neurologic: Denies current headache, new weakness, focal weakness, or sensory changes    Lymphatic: Denies swollen glands    Psychiatric: Denies depression, suicidal ideation or homicidal ideation.    Remainder of systems reviewed, unless noted in HPI all others negative.      PAST MEDICAL HISTORY:  No past medical history on file.    PAST SURGICAL HISTORY:  No past surgical history on file.        CURRENT MEDICATIONS:    Prior to Admission medications    Not on File         ALLERGIES:  No Known Allergies    FAMILY HISTORY:  No family history on file.    SOCIAL HISTORY:   Social History     Socioeconomic History     Marital status: Not on file     Spouse name: Not on file     Number of children: Not on file     Years of education: Not on file     Highest education  "level: Not on file   Occupational History     Not on file   Tobacco Use     Smoking status: Not on file   Substance and Sexual Activity     Alcohol use: Not on file     Drug use: Not on file     Sexual activity: Not on file   Other Topics Concern     Not on file   Social History Narrative     Not on file     Social Determinants of Health     Financial Resource Strain:      Difficulty of Paying Living Expenses:    Food Insecurity:      Worried About Running Out of Food in the Last Year:      Ran Out of Food in the Last Year:    Transportation Needs:      Lack of Transportation (Medical):      Lack of Transportation (Non-Medical):    Physical Activity:      Days of Exercise per Week:      Minutes of Exercise per Session:    Stress:      Feeling of Stress :    Social Connections:      Frequency of Communication with Friends and Family:      Frequency of Social Gatherings with Friends and Family:      Attends [employer] Services:      Active Member of Clubs or Organizations:      Attends Club or Organization Meetings:      Marital Status:    Intimate Partner Violence:      Fear of Current or Ex-Partner:      Emotionally Abused:      Physically Abused:      Sexually Abused:        PHYSICAL EXAM    BP (!) 144/63   Pulse 64   Temp 98  F (36.7  C) (Oral)   Resp 28   Ht 1.727 m (5' 8\")   Wt 104.3 kg (230 lb)   SpO2 96%   BMI 34.97 kg/m    General presentation: Alert, Vital signs reviewed. NAD  HENT: ENT inspection is normal. Oropharynx is moist and clear.   Eye: Pupils are equal and reactive to light. EOMI  Neck: The neck is supple, with full ROM, with no evidence of meningismus.  Pulmonary: Mild respiratory distress. Tachypneic. Conversational dyspnea. Diffuse scatter inspiratory and expiratory wheezing bilaterally.    Circulatory: Regular rate and rhythm. Peripheral pulses are strong and equal. No murmurs, rubs, or gallops.   Abdominal: The abdomen is soft. Nontender. No rigidity, guarding, or rebound. Bowel sounds " normal.   Neurologic: Alert, oriented to person, place, and time. No motor deficit. No sensory deficit. Cranial nerves II through XII are intact.  Musculoskeletal: No extremity tenderness. Full range of motion in all extremities. No extremity edema.   Skin: Skin color is normal. No rash. Warm. Dry to touch.        LAB:  All pertinent labs reviewed and interpreted.  Labs Ordered and Resulted from Time of ED Arrival Up to the Time of Departure from the ED   BASIC METABOLIC PANEL - Abnormal; Notable for the following components:       Result Value    Chloride 108 (*)     Glucose 152 (*)     All other components within normal limits   CBC WITH PLATELETS - Normal   TROPONIN I - Normal   CRP INFLAMMATION - Normal   SARS-COV2 (COVID-19) VIRUS RT-PCR - Normal    Narrative:     Testing was performed using the ke  SARS-CoV-2 & Influenza A/B Assay on the ke  [employer]  System.  This test should be ordered for the detection of SARS-COV-2 in individuals who meet SARS-CoV-2 clinical and/or epidemiological criteria. Test performance is unknown in asymptomatic patients.  This test is for in vitro diagnostic use under the FDA EUA for laboratories certified under CLIA to perform moderate and/or high complexity testing. This test has not been FDA cleared or approved.  A negative test does not rule out the presence of PCR inhibitors in the specimen or target RNA in concentration below the limit of detection for the assay. The possibility of a false negative should be considered if the patient's recent exposure or clinical presentation suggests COVID-19.  [facility] are certified under the Clinical Laboratory Improvement Amendments of 1988 (CLIA-88) as qualified to perform moderate and/or high complexity laboratory testing.   EXTRA BLUE TOP TUBE   EXTRA RED TOP TUBE   B-TYPE NATRIURETIC PEPTIDE ([facility] ONLY)   PERIPHERAL IV CATHETER   PULSE OXIMETRY NURSING   CARDIAC CONTINUOUS MONITORING   COVID-19 VIRUS  (CORONAVIRUS) BY PCR    Narrative:     The following orders were created for panel order Symptomatic COVID-19 Virus (Coronavirus) by PCR Nasopharyngeal.  Procedure                               Abnormality         Status                     ---------                               -----------         ------                     SARS-COV2 (COVID-19) Vir...[[id number]]  Normal              Final result                 Please view results for these tests on the individual orders.   EXTRA TUBE    Narrative:     The following orders were created for panel order Momence Draw.  Procedure                               Abnormality         Status                     ---------                               -----------         ------                     Extra Blue Top Tube[[id number]]                              Final result               Extra Red Top Tube[[id number]]                               Final result                 Please view results for these tests on the individual orders.       RADIOLOGY:  Reviewed all pertinent imaging. Please see official radiology reports  XR Chest 2 Views   Preliminary Result   IMPRESSION: No change in dual-lead AICD. Heart and mediastinal size are normal. Stable scarring within the lingula. No new airspace opacities. No pleural effusion or pneumothorax.            EKG:    Atrial paced rhythm.  Rate is 61.  Left bundle much block.  Prolonged QT.  No previous EKG available for comparison.    I have independently reviewed and interpreted the EKG(s) documented above.    PROCEDURES:   None      I, Ange Reyna, am serving as a scribe to document services personally performed by Dr. Lo Starr based on my observation and the provider's statements to me. I, Lo Starr, DO attest that Ange Reyna is acting in a scribe capacity, has observed my performance of the services and has documented them in accordance with my direction.    Lo Starr D.O.  Emergency Medicine  [facility]  [facility] EMERGENCY DEPARTMENT  [address] [place] [id number]  [phone]  Dept: [phone]       Lo Starr DO  08/07/21 1543

## 2021-08-08 VITALS
BODY MASS INDEX: 35.01 KG/M2 | DIASTOLIC BLOOD PRESSURE: 60 MMHG | TEMPERATURE: 97.6 F | HEIGHT: 68 IN | SYSTOLIC BLOOD PRESSURE: 121 MMHG | HEART RATE: 70 BPM | RESPIRATION RATE: 22 BRPM | WEIGHT: 231 LBS | OXYGEN SATURATION: 93 %

## 2021-08-08 LAB
ANION GAP SERPL CALCULATED.3IONS-SCNC: 8 MMOL/L (ref 5–18)
BNP SERPL-MCNC: 320 PG/ML (ref 0–120)
BUN SERPL-MCNC: 12 MG/DL (ref 8–28)
CALCIUM SERPL-MCNC: 8.9 MG/DL (ref 8.5–10.5)
CHLORIDE BLD-SCNC: 106 MMOL/L (ref 98–107)
CO2 SERPL-SCNC: 26 MMOL/L (ref 22–31)
CREAT SERPL-MCNC: 0.79 MG/DL (ref 0.6–1.1)
ERYTHROCYTE [DISTWIDTH] IN BLOOD BY AUTOMATED COUNT: 12.8 % (ref 10–15)
GFR SERPL CREATININE-BSD FRML MDRD: 76 ML/MIN/1.73M2
GLUCOSE BLD-MCNC: 172 MG/DL (ref 70–125)
GLUCOSE BLDC GLUCOMTR-MCNC: 145 MG/DL (ref 70–125)
GLUCOSE BLDC GLUCOMTR-MCNC: 164 MG/DL (ref 70–125)
GLUCOSE BLDC GLUCOMTR-MCNC: 174 MG/DL (ref 70–125)
HCT VFR BLD AUTO: 41.5 % (ref 35–47)
HGB BLD-MCNC: 13.7 G/DL (ref 11.7–15.7)
INR PPP: 4.63 (ref 0.9–1.15)
MCH RBC QN AUTO: 30.2 PG (ref 26.5–33)
MCHC RBC AUTO-ENTMCNC: 33 G/DL (ref 31.5–36.5)
MCV RBC AUTO: 91 FL (ref 78–100)
PLATELET # BLD AUTO: 195 10E3/UL (ref 150–450)
POTASSIUM BLD-SCNC: 3.9 MMOL/L (ref 3.5–5)
RBC # BLD AUTO: 4.54 10E6/UL (ref 3.8–5.2)
SODIUM SERPL-SCNC: 140 MMOL/L (ref 136–145)
WBC # BLD AUTO: 5.7 10E3/UL (ref 4–11)

## 2021-08-08 PROCEDURE — 80048 BASIC METABOLIC PNL TOTAL CA: CPT | Performed by: FAMILY MEDICINE

## 2021-08-08 PROCEDURE — 85027 COMPLETE CBC AUTOMATED: CPT | Performed by: FAMILY MEDICINE

## 2021-08-08 PROCEDURE — 83880 ASSAY OF NATRIURETIC PEPTIDE: CPT | Performed by: FAMILY MEDICINE

## 2021-08-08 PROCEDURE — G0378 HOSPITAL OBSERVATION PER HR: HCPCS

## 2021-08-08 PROCEDURE — 99207 PR CDG-CODE CATEGORY CHANGED: CPT | Performed by: HOSPITALIST

## 2021-08-08 PROCEDURE — 250N000013 HC RX MED GY IP 250 OP 250 PS 637: Performed by: FAMILY MEDICINE

## 2021-08-08 PROCEDURE — 36415 COLL VENOUS BLD VENIPUNCTURE: CPT | Performed by: FAMILY MEDICINE

## 2021-08-08 PROCEDURE — 250N000012 HC RX MED GY IP 250 OP 636 PS 637: Performed by: FAMILY MEDICINE

## 2021-08-08 PROCEDURE — 99217 PR OBSERVATION CARE DISCHARGE: CPT | Performed by: HOSPITALIST

## 2021-08-08 PROCEDURE — 85610 PROTHROMBIN TIME: CPT | Performed by: FAMILY MEDICINE

## 2021-08-08 RX ORDER — PREDNISONE 20 MG/1
40 TABLET ORAL DAILY
Qty: 5 TABLET | Refills: 0 | Status: SHIPPED | OUTPATIENT
Start: 2021-08-08 | End: 2021-08-13

## 2021-08-08 RX ADMIN — CYANOCOBALAMIN TAB 1000 MCG 2000 MCG: 1000 TAB at 08:50

## 2021-08-08 RX ADMIN — FAMOTIDINE 20 MG: 20 TABLET, FILM COATED ORAL at 08:48

## 2021-08-08 RX ADMIN — PANTOPRAZOLE SODIUM 40 MG: 20 TABLET, DELAYED RELEASE ORAL at 08:51

## 2021-08-08 RX ADMIN — SOTALOL HYDROCHLORIDE 80 MG: 80 TABLET ORAL at 08:48

## 2021-08-08 RX ADMIN — CARVEDILOL 25 MG: 12.5 TABLET, FILM COATED ORAL at 08:48

## 2021-08-08 RX ADMIN — BUDESONIDE AND FORMOTEROL FUMARATE DIHYDRATE 2 PUFF: 80; 4.5 AEROSOL RESPIRATORY (INHALATION) at 08:48

## 2021-08-08 RX ADMIN — LEVOTHYROXINE SODIUM 137 MCG: 137 TABLET ORAL at 06:12

## 2021-08-08 RX ADMIN — DULOXETINE HYDROCHLORIDE 20 MG: 20 CAPSULE, DELAYED RELEASE ORAL at 08:50

## 2021-08-08 RX ADMIN — PREDNISONE 20 MG: 20 TABLET ORAL at 08:50

## 2021-08-08 RX ADMIN — SPIRONOLACTONE 25 MG: 25 TABLET, FILM COATED ORAL at 08:49

## 2021-08-08 RX ADMIN — LISINOPRIL 10 MG: 5 TABLET ORAL at 08:48

## 2021-08-08 RX ADMIN — Medication 50 MCG: at 08:49

## 2021-08-08 ASSESSMENT — MIFFLIN-ST. JEOR: SCORE: 1616.31

## 2021-08-08 NOTE — PLAN OF CARE
Patient arrived on unit at 1800. She is A&Ox4. She c/o SOB on movement. O2 sats dropped to 88% when O2 was removed. O2 remains at 93 to 94% on 2 L.  Patient has expiratory wheezes throughout lung fields.  She has a frequent non productive cough. Up to bathroom with stand by assist. She is med compliant. Patient ate 100% of dinner. Patient has SCD's on. Requested prn Ambien and Melatonin at HS.   Problem: Activity Intolerance (Pulmonary Impairment)  Goal: Improved Activity Tolerance  Outcome: No Change     Problem: Gas Exchange Impaired (Pulmonary Impairment)  Goal: Optimal Gas Exchange  Outcome: Improving     Problem: Adult Inpatient Plan of Care  Goal: Readiness for Transition of Care  Intervention: Mutually Develop Transition Plan  Recent Flowsheet Documentation  Taken 8/7/2021 1800 by Mariana Armenta, RN  Patient/Family Anticipates Transition to: home     Problem: Risk for Delirium  Goal: Optimal Coping  Intervention: Optimize Psychosocial Adjustment to Delirium  Recent Flowsheet Documentation  Taken 8/7/2021 1817 by Mariana Armenta, RN  Supportive Measures:   active listening utilized   positive reinforcement provided   self-care encouraged

## 2021-08-08 NOTE — PROGRESS NOTES
Order for noc CPAP in place. However, patient shared that sleep apnea is not confirmed--no overnight sleep study. Shared her OP pulm wants her to do work-up; prefers to not start PAP devices until apnea is confirmed. As such, didn't place device in room.     Aman Amaya, RRT

## 2021-08-08 NOTE — UTILIZATION REVIEW
"Admission Status; Secondary Review Determination         Under the authority of the Utilization Management Committee, the utilization review process indicated a secondary review on the above patient.  The review outcome is based on review of the medical records, discussions with staff, and applying clinical experience noted on the date of the review.          (x) Observation Status Appropriate - This patient does not meet hospital inpatient criteria and is placed in observation status. If this patient's primary payer is Medicare and was admitted as an inpatient, Condition Code 44 should be used and patient status changed to \"observation\".       RATIONALE FOR DETERMINATION     Ms. Pablo is a 70 female with a PMH of COPD who presents to the ED with increasing SOB and cough.  COVID testing was negative; CXR was without any new infiltrates.  She was given one dose of IV solumedrol on admission and then today transitioned to po prednisone.  Her respiratory status has greatly improved and she does not qualify for home oxygen.  She will be discharging home later today    The severity of illness, intensity of service provided, expected LOS and risk for adverse outcome make the care appropriate for further observation; however, doesn't meet criteria for hospital inpatient admission. Dr Kennedy notified of this determination, awaiting call back.    The information on this document is developed by the utilization review team in order for the business office to ensure compliance.  This only denotes the appropriateness of proper admission status and does not reflect the quality of care rendered.         The definitions of Inpatient Status and Observation Status used in making the determination above are those provided in the CMS Coverage Manual, Chapter 1 and Chapter 6, section 70.4.        Sincerely,    Leonora Blue, DO  Utilization Review  Physician Advisor  [facility]  "

## 2021-08-08 NOTE — PROGRESS NOTES
Patient discharged home with belongings at 1500.Paperwork was discussed with patient, VSS, no verbal or nonverbal expressions of pain. Son drove patient home.

## 2021-08-08 NOTE — PROGRESS NOTES
"[facility]  Hospitalist Progress Note  [facility]        Date of Service (when I saw the patient): 2021  Colby Kennedy,   2021        Interval History:      Patient states she is doing well and improving, discussed plan of care, verbalized understanding.          Assessment and Plan:        Fatemeh Pablo is a 70 year old female admitted on 2021. She presents with recurrence of shortness of breath and coughing since her recent hospitalization for COPD exacerbation.       Acute COPD exacerbation: Solu-Medrol given in ED.   - Prednisone.   - Pulmonary hygiene.   - Rest exercise testing.      Ischemic cardiomyopathy: She describes her symptoms of orthopnea and PND.  Troponin is negative. With ejection fraction recently measured below 35%.  Patient has automatic defibrillator.  - Monitor.      Atrial fibrillation: Patient is currently rate controlled on Coumadin.  - Coumadin, pharmacy to dose.      Type 2 diabetes: sliding scale      Bipolar disorder: Continue home medications.     Obesity: Body mass index is 34.97 kg/m .    DVT Prophylaxis: Warfarin    Coagulation Defect: home medication list includes an anticoagulant medication    Diet: Orders Placed This Encounter      Combination Diet Regular Diet Adult; Consistent Carb 90 Grams CHO per Meal Diet    Code: Full Code    Anticipated discharge date: Today or tomorrow.   Milestones/needs for discharge: Oxygen testing.   Pending tests or labs: None.   Status of family updates: Updated patient.   Length of Stay:  LOS: 1 day /LOS: 1                   Physical Exam:           Blood pressure 121/60, pulse 70, temperature 97.6  F (36.4  C), temperature source Oral, resp. rate 22, height 1.727 m (5' 8\"), weight 104.8 kg (231 lb), SpO2 95 %.    Vital Sign Ranges  Temperature Temp  Av  F (36.7  C)  Min: 97.6  F (36.4  C)  Max: 98.4  F (36.9  C)   Blood pressure Systolic (24hrs), Av , Min:111 , Max:175 "        Diastolic (24hrs), Av, Min:54, Max:81      Pulse Pulse  Av.1  Min: 63  Max: 89   Respirations Resp  Av.8  Min: 17  Max: 42   Pulse oximetry SpO2  Av.9 %  Min: 91 %  Max: 96 %     Vital Signs with Ranges  Temp:  [97.6  F (36.4  C)-98.4  F (36.9  C)] 97.6  F (36.4  C)  Pulse:  [63-89] 70  Resp:  [17-42] 22  BP: (111-175)/(54-81) 121/60  SpO2:  [91 %-96 %] 95 %  Temp:  [97.6  F (36.4  C)-98.4  F (36.9  C)] 97.6  F (36.4  C)  Pulse:  [63-89] 70  Resp:  [17-42] 22  BP: (111-175)/(54-81) 121/60  SpO2:  [91 %-96 %] 95 %    I/O Last 3 Shifts:   I/O last 3 completed shifts:  In: 963 [P.O.:960; I.V.:3]  Out: 1500 [Urine:1500]    I/O past 24 hours:     Intake/Output Summary (Last 24 hours) at 2021 0750  Last data filed at 2021 0315  Gross per 24 hour   Intake 963 ml   Output 1500 ml   Net -537 ml       Oxygen  Temp: 97.6  F (36.4  C) Temp src: Oral BP: 121/60 Pulse: 70   Resp: 22 SpO2: 95 % O2 Device: Nasal cannula Oxygen Delivery: 2 LPM  Vitals:    21 1113 21 0421   Weight: 104.3 kg (230 lb) 104.8 kg (231 lb)       Lines  Peripheral IV 21 (Active)   Site Assessment WDL 21 0200   Line Status Saline locked 21 0200   Phlebitis Scale 0-->no symptoms 21 0200   Infiltration Scale 0 21 1248   Number of days: 1     GENERAL: Alert and oriented. NAD. Conversational, appropriate.   HEENT: Normocephalic. EOMI. No icterus or injection. Nares normal.   LUNGS: Mild end expiratory wheeze. No dyspnea at rest.   HEART: Regular rate. Extremities perfused.   ABDOMEN: Soft, nontender, and nondistended. Positive bowel sounds.   EXTREMITIES: Minimal LE edema noted.   NEUROLOGIC: Moves extremities x4, follows commands.          Prior to Admission Medications:        Medications Prior to Admission   Medication Sig Dispense Refill Last Dose     acetaminophen (TYLENOL) 325 MG tablet Take 325-650 mg by mouth every 6 hours as needed for mild pain   Past Week at Unknown time      albuterol (PROAIR HFA/PROVENTIL HFA/VENTOLIN HFA) 108 (90 Base) MCG/ACT inhaler Inhale 2 puffs into the lungs every 6 hours   Past Week at Unknown time     atorvastatin (LIPITOR) 40 MG tablet Take 40 mg by mouth daily   8/6/2021 at pm     budesonide-formoterol (SYMBICORT) 80-4.5 MCG/ACT Inhaler Inhale 2 puffs into the lungs 2 times daily   8/6/2021 at pm     carvedilol (COREG) 25 MG tablet Take 25 mg by mouth 2 times daily (with meals)   8/7/2021 at am     cholecalciferol (VITAMIN D3) 25 mcg (1000 units) capsule Take 2 capsules by mouth daily   8/7/2021 at am     DULoxetine (CYMBALTA) 20 MG capsule Take 20 mg by mouth daily    8/7/2021 at am     ezetimibe (ZETIA) 10 MG tablet Take 10 mg by mouth daily   8/6/2021 at pm     famotidine (PEPCID) 20 MG tablet Take 20 mg by mouth 2 times daily   8/7/2021 at am     ipratropium - albuterol 0.5 mg/2.5 mg/3 mL (DUONEB) 0.5-2.5 (3) MG/3ML neb solution Take 1 vial by nebulization every 6 hours as needed for shortness of breath / dyspnea or wheezing   8/7/2021 at am     levothyroxine (SYNTHROID/LEVOTHROID) 137 MCG tablet Take 137 mcg by mouth daily   8/7/2021 at am     lisinopril (ZESTRIL) 5 MG tablet Take 10 mg by mouth daily   8/7/2021 at am     nitroGLYcerin (NITROSTAT) 0.4 MG sublingual tablet Place 0.4 mg under the tongue every 5 minutes as needed for chest pain For chest pain place 1 tablet under the tongue every 5 minutes for 3 doses. If symptoms persist 5 minutes after 1st dose call 911.   Past Week at Unknown time     oxybutynin ER (DITROPAN-XL) 10 MG 24 hr tablet Take 10 mg by mouth daily   8/7/2021 at am     pantoprazole sodium (PROTONIX) 40 MG packet Take 1 packet by mouth daily   8/7/2021 at am     QUEtiapine (SEROQUEL) 400 MG tablet Take 400 mg by mouth At Bedtime   8/6/2021 at pm     sotalol (BETAPACE) 80 MG tablet Take 80 mg by mouth 2 times daily   8/7/2021 at am     spironolactone (ALDACTONE) 25 MG tablet Take 25 mg by mouth daily   8/7/2021 at am      sucralfate (CARAFATE) 1 GM tablet Take 1 g by mouth daily   8/6/2021 at pm     vitamin B-12 (CYANOCOBALAMIN) 2000 MCG tablet Take 2,000 mcg by mouth daily   8/7/2021 at am     warfarin ANTICOAGULANT (COUMADIN) 5 MG tablet Take 5 mg by mouth See Admin Instructions Take 5 mg on Sunday and Tuesday and 7.5 mg all other days   8/7/2021 at pm     zolpidem (AMBIEN) 5 MG tablet Take 5 mg by mouth nightly as needed for sleep   8/6/2021 at pm            Medications:        Current Facility-Administered Medications   Medication Last Rate     Warfarin Therapy Reminder       Current Facility-Administered Medications   Medication Dose Route Frequency     atorvastatin  40 mg Oral At Bedtime     budesonide-formoterol  2 puff Inhalation BID     carvedilol  25 mg Oral BID w/meals     cyanocobalamin  2,000 mcg Oral Daily     DULoxetine  20 mg Oral Daily     ezetimibe  10 mg Oral At Bedtime     famotidine  20 mg Oral BID     insulin aspart  1-7 Units Subcutaneous TID AC     insulin aspart  1-5 Units Subcutaneous At Bedtime     levothyroxine  137 mcg Oral QAM AC     lisinopril  10 mg Oral Daily     [Held by provider] oxybutynin ER  10 mg Oral Daily     pantoprazole  40 mg Oral Daily     predniSONE  20 mg Oral BID w/meals     QUEtiapine  400 mg Oral At Bedtime     sodium chloride (PF)  3 mL Intracatheter Q8H     sotalol  80 mg Oral BID     spironolactone  25 mg Oral Daily     sucralfate  1 g Oral At Bedtime     Vitamin D3  50 mcg Oral Daily     Current Facility-Administered Medications   Medication Dose Route Frequency     acetaminophen  325-650 mg Oral Q6H PRN     albuterol  2.5 mg Nebulization Q2H PRN     glucose  15-30 g Oral Q15 Min PRN    Or     dextrose  25-50 mL Intravenous Q15 Min PRN    Or     glucagon  1 mg Subcutaneous Q15 Min PRN     lidocaine 4%   Topical Q1H PRN     lidocaine (buffered or not buffered)  0.1-1 mL Other Q1H PRN     melatonin  1 mg Oral At Bedtime PRN     nicotine  2 mg Buccal Q1H PRN     nitroGLYcerin  0.4 mg  Sublingual Q5 Min PRN     sodium chloride (PF)  3 mL Intracatheter q1 min prn     Warfarin Therapy Reminder  1 each Does not apply Continuous PRN     zolpidem  5 mg Oral At Bedtime PRN     ___________________________________________________________________________  Medications     Warfarin Therapy Reminder         atorvastatin  40 mg Oral At Bedtime     budesonide-formoterol  2 puff Inhalation BID     carvedilol  25 mg Oral BID w/meals     cyanocobalamin  2,000 mcg Oral Daily     DULoxetine  20 mg Oral Daily     ezetimibe  10 mg Oral At Bedtime     famotidine  20 mg Oral BID     insulin aspart  1-7 Units Subcutaneous TID AC     insulin aspart  1-5 Units Subcutaneous At Bedtime     levothyroxine  137 mcg Oral QAM AC     lisinopril  10 mg Oral Daily     [Held by provider] oxybutynin ER  10 mg Oral Daily     pantoprazole  40 mg Oral Daily     predniSONE  20 mg Oral BID w/meals     QUEtiapine  400 mg Oral At Bedtime     sodium chloride (PF)  3 mL Intracatheter Q8H     sotalol  80 mg Oral BID     spironolactone  25 mg Oral Daily     sucralfate  1 g Oral At Bedtime     Vitamin D3  50 mcg Oral Daily          Data:      Lab data reviewed.     Data   Recent Labs   Lab 08/08/21  0626 08/08/21  0248 08/07/21  1247   WBC 5.7  --  6.0   HGB 13.7  --  14.0   MCV 91  --  94     --  187   INR 4.63*  --   --      --  141   POTASSIUM 3.9  --  4.9   CHLORIDE 106  --  108*   CO2 26  --  28   BUN 12  --  8   CR 0.79  --  0.81   ANIONGAP 8  --  5   LAURITA 8.9  --  9.3   * 174* 152*           Imaging:      Imaging data reviewed.     Recent Results (from the past 24 hour(s))   XR Chest 2 Views    Narrative    EXAM: XR CHEST 2 VW  LOCATION: [facility]  DATE/TIME: 8/7/2021 1:05 PM    INDICATION: Dyspnea.  COMPARISON: 6/10/2021.      Impression    IMPRESSION: No change in dual-lead AICD. Heart and mediastinal size are normal. Stable scarring within the lingula. No new airspace opacities. No pleural  effusion or pneumothorax.       Dr. Colby Kennedy DO, MBA  [facility]  Pager [phone]

## 2021-08-08 NOTE — PLAN OF CARE
Problem: Activity Intolerance (Pulmonary Impairment)  Goal: Improved Activity Tolerance  Outcome: Improving   Able to wean patient down to 1 L NC.  Problem: Adult Inpatient Plan of Care  Goal: Absence of Hospital-Acquired Illness or Injury  Intervention: Identify and Manage Fall Risk  Recent Flowsheet Documentation  Taken 8/8/2021 0800 by Sharif Taylor, RN  Safety Promotion/Fall Prevention: assistive device/personal items within reach   Patient has good balance, ambulating to bathroom. SBA  Problem: Adult Inpatient Plan of Care  Goal: Absence of Hospital-Acquired Illness or Injury  Intervention: Prevent Skin Injury  Recent Flowsheet Documentation  Taken 8/8/2021 0800 by Sharif Taylor, RN  Body Position: supine  Patient independent during repositioning.

## 2021-08-08 NOTE — PROVIDER NOTIFICATION
08/08/21 1033 08/08/21 1035 08/08/21 1037   Oxygen Therapy   SpO2 96 % 94 % 95 %   O2 Device Nasal cannula None (Room air) None (Room air)   Oxygen Delivery 2 LPM  --   --    On home oxygen evaluation. Pt does not qualiify for home oxygen. Pt Spo2 remain above 94% during activity on Room air.8/8/2021  .Mynor Bhagat RT'

## 2021-08-08 NOTE — DISCHARGE SUMMARY
Physician Discharge Summary        Primary Care Physician:  Faiza Purvis Admission Date: 8/7/2021   Discharge Provider: Colby Kennedy DO Discharge Date: 8/8/2021   Disposition: home Code Status: Full Code   Activity: as tolerated Diet: Orders Placed This Encounter      Combination Diet Regular Diet Adult; Consistent Carb 90 Grams CHO per Meal Diet      Diet        Condition at Discharge: stable.       Discharge Diagnoses/Hospital Summary:      Fatemeh Pablo is a 70 year old female admitted on 8/7/2021. She presents with recurrence of shortness of breath and coughing since her recent hospitalization for COPD exacerbation.       Acute COPD exacerbation: Solu-Medrol given in ED.   - Prednisone course.   - Close outpatient follow up.   - Pulmonary hygiene.   - Rest exercise testing completed.      Ischemic cardiomyopathy: She describes her symptoms of orthopnea and PND.  Troponin is negative. With ejection fraction recently measured below 35%.  Patient has automatic defibrillator.  - Close outpatient follow up.      Atrial fibrillation: Patient is currently rate controlled on Coumadin.  - Coumadin, pharmacy to give recommendations for dosing on discharge. Discussed with pharmacy, they will enter dosing recommendations for discharge.      Type 2 diabetes: Continue previously established outpatient treatment.      Bipolar disorder: Continue home medications.     Obesity: Body mass index is 34.97 kg/m .     Coagulation Defect: home medication list includes an anticoagulant medication                    Discharge Medications:      Review of your medicines      START taking      Dose / Directions   predniSONE 20 MG tablet  Commonly known as: DELTASONE      Dose: 40 mg  Take 2 tablets (40 mg) by mouth daily for 5 days  Quantity: 5 tablet  Refills: 0        CONTINUE these medicines which have NOT CHANGED      Dose / Directions   acetaminophen 325 MG tablet  Commonly known as: TYLENOL      Dose: 325-650 mg  Take  325-650 mg by mouth every 6 hours as needed for mild pain  Refills: 0     albuterol 108 (90 Base) MCG/ACT inhaler  Commonly known as: PROAIR HFA/PROVENTIL HFA/VENTOLIN HFA      Dose: 2 puff  Inhale 2 puffs into the lungs every 6 hours  Refills: 0     atorvastatin 40 MG tablet  Commonly known as: LIPITOR      Dose: 40 mg  Take 40 mg by mouth daily  Refills: 0     budesonide-formoterol 80-4.5 MCG/ACT Inhaler  Commonly known as: SYMBICORT      Dose: 2 puff  Inhale 2 puffs into the lungs 2 times daily  Refills: 0     carvedilol 25 MG tablet  Commonly known as: COREG      Dose: 25 mg  Take 25 mg by mouth 2 times daily (with meals)  Refills: 0     cholecalciferol 25 mcg (1000 units) capsule  Commonly known as: VITAMIN D3      Dose: 2 capsule  Take 2 capsules by mouth daily  Refills: 0     DULoxetine 20 MG capsule  Commonly known as: CYMBALTA      Dose: 20 mg  Take 20 mg by mouth daily  Refills: 0     ezetimibe 10 MG tablet  Commonly known as: ZETIA      Dose: 10 mg  Take 10 mg by mouth daily  Refills: 0     famotidine 20 MG tablet  Commonly known as: PEPCID      Dose: 20 mg  Take 20 mg by mouth 2 times daily  Refills: 0     ipratropium - albuterol 0.5 mg/2.5 mg/3 mL 0.5-2.5 (3) MG/3ML neb solution  Commonly known as: DUONEB      Dose: 1 vial  Take 1 vial by nebulization every 6 hours as needed for shortness of breath / dyspnea or wheezing  Refills: 0     levothyroxine 137 MCG tablet  Commonly known as: SYNTHROID/LEVOTHROID      Dose: 137 mcg  Take 137 mcg by mouth daily  Refills: 0     lisinopril 5 MG tablet  Commonly known as: ZESTRIL      Dose: 10 mg  Take 10 mg by mouth daily  Refills: 0     nitroGLYcerin 0.4 MG sublingual tablet  Commonly known as: NITROSTAT      Dose: 0.4 mg  Place 0.4 mg under the tongue every 5 minutes as needed for chest pain For chest pain place 1 tablet under the tongue every 5 minutes for 3 doses. If symptoms persist 5 minutes after 1st dose call 911.  Refills: 0     oxybutynin ER 10 MG 24 hr  tablet  Commonly known as: DITROPAN-XL      Dose: 10 mg  Take 10 mg by mouth daily  Refills: 0     pantoprazole sodium 40 MG packet  Commonly known as: PROTONIX      Dose: 1 packet  Take 1 packet by mouth daily  Refills: 0     QUEtiapine 400 MG tablet  Commonly known as: SEROquel      Dose: 400 mg  Take 400 mg by mouth At Bedtime  Refills: 0     sotalol 80 MG tablet  Commonly known as: BETAPACE      Dose: 80 mg  Take 80 mg by mouth 2 times daily  Refills: 0     spironolactone 25 MG tablet  Commonly known as: ALDACTONE      Dose: 25 mg  Take 25 mg by mouth daily  Refills: 0     sucralfate 1 GM tablet  Commonly known as: CARAFATE      Dose: 1 g  Take 1 g by mouth daily  Refills: 0     vitamin B-12 2000 MCG tablet  Commonly known as: CYANOCOBALAMIN      Dose: 2,000 mcg  Take 2,000 mcg by mouth daily  Refills: 0     warfarin ANTICOAGULANT 5 MG tablet  Commonly known as: COUMADIN      Dose: 5 mg  Take 5 mg by mouth See Admin Instructions Take 5 mg on Sunday and Tuesday and 7.5 mg all other days  Refills: 0     zolpidem 5 MG tablet  Commonly known as: AMBIEN      Dose: 5 mg  Take 5 mg by mouth nightly as needed for sleep  Refills: 0           Where to get your medicines      Some of these will need a paper prescription and others can be bought over the counter. Ask your nurse if you have questions.    Bring a paper prescription for each of these medications    predniSONE 20 MG tablet               Discharge Instructions:  Follow up appointment with Primary Care Physician: Faiza Purvis  Follow up appointment with Specialist: Pulmonary, next available.        Vital Signs in last 24 hours:    Temp:  [97.6  F (36.4  C)-98.4  F (36.9  C)] 97.6  F (36.4  C)  Pulse:  [63-89] 70  Resp:  [17-42] 22  BP: (111-175)/(54-81) 121/60  SpO2:  [91 %-96 %] 95 %    GENERAL: Alert and oriented. NAD. Conversational, appropriate.   HEENT: Normocephalic. EOMI. No icterus or injection. Nares normal.   LUNGS: Mild end expiratory wheeze. No  dyspnea at rest.   HEART: Regular rate. Extremities perfused.   ABDOMEN: Soft, nontender, and nondistended. Positive bowel sounds.   EXTREMITIES: Minimal LE edema noted.   NEUROLOGIC: Moves extremities x4, follows commands.        Total Time on discharge process is: 33 minutes    Dr. Colby Kennedy DO, BIJAN  Internal Medicine Hospitalist  8/8/2021

## 2021-08-08 NOTE — PLAN OF CARE
Problem: Adult Inpatient Plan of Care  Goal: Optimal Comfort and Wellbeing  Outcome: Improving     Problem: Risk for Delirium  Goal: Optimal Coping  Outcome: Improving     Problem: Gas Exchange Impaired (Pulmonary Impairment)  Goal: Optimal Gas Exchange  Outcome: Improving   Pt slept most of the shift. Denies having pain/discomfort. O2 98% at 2L/NC. sob with activity. VS stable. Cooperative with all cares.

## 2021-08-09 ENCOUNTER — PATIENT OUTREACH (OUTPATIENT)
Dept: CARE COORDINATION | Facility: CLINIC | Age: 71
End: 2021-08-09

## 2021-08-09 DIAGNOSIS — Z71.89 OTHER SPECIFIED COUNSELING: ICD-10-CM

## 2021-08-09 LAB
ATRIAL RATE - MUSE: 61 BPM
DIASTOLIC BLOOD PRESSURE - MUSE: NORMAL MMHG
INTERPRETATION ECG - MUSE: NORMAL
P AXIS - MUSE: NORMAL DEGREES
PR INTERVAL - MUSE: 136 MS
QRS DURATION - MUSE: 152 MS
QT - MUSE: 502 MS
QTC - MUSE: 505 MS
R AXIS - MUSE: -39 DEGREES
SYSTOLIC BLOOD PRESSURE - MUSE: NORMAL MMHG
T AXIS - MUSE: 86 DEGREES
VENTRICULAR RATE- MUSE: 61 BPM

## 2021-08-09 NOTE — PROGRESS NOTES
"Clinic Care Coordination Contact  [facility]: Post-Discharge Note  SITUATION                                                      Admission:    Admission Date: 08/07/21   Reason for Admission: COPD exacerbation (H)  Discharge:   Discharge Date: 08/08/21  Discharge Diagnosis: COPD exacerbation (H)    BACKGROUND                                                      Fatemeh Pablo is a 70 year old female admitted on 8/7/2021. She presents with recurrence of shortness of breath and coughing since her recent hospitalization for COPD exacerbation.  She has not been able to follow-up with pulmonology.  She is not on prednisone.  And her albuterol does not alleviate symptoms for more than 2 hours.     Acute COPD exacerbation: Solu-Medrol given in ED, will start prednisone 20 mg p.o. twice daily she should likely taper this slowly until she gets to her outpatient follow-up with pulmonology on the 27th.  Continue to offer albuterol nebs every 2 hours as needed.  She discussed need for home oxygen but I explained that she can likely control symptoms with home prednisone until follow-up.    ASSESSMENT      Discharge Assessment  How are you doing now that you are home?: about the same  How are your symptoms? (Red Flag symptoms escalate to triage hotline per guidelines): Unchanged  Do you feel your condition is stable enough to be safe at home until your provider visit?: Yes (\"I hope so, we'll see\". CTA gave Pt the number for nurse triage should any pressing concerns arise.)  Does the patient have their discharge instructions? : Yes  Does the patient have questions regarding their discharge instructions? : No  Were you started on any new medications or were there changes to any of your previous medications? : Yes - Schedule [facility] appt within 48 business hours (no questions)  Does the patient have all of their medications?: Yes  Do you have questions regarding any of your medications? : No  Do you have all of your needed " medical supplies or equipment (DME)?  (i.e. oxygen tank, CPAP, cane, etc.): Yes  Discharge follow-up appointment scheduled within 14 calendar days? : Yes  Discharge Follow Up Appointment Date: 08/26/21  Discharge Follow Up Appointment Scheduled with?: Specialty Care Provider    Post-op  Did the patient have surgery or a procedure: No  Fever: No  Chills: No  Eating & Drinking: eating and drinking without complaints/concerns  PO Intake: regular diet  Bowel Function: normal  Urinary Status: voiding without complaint/concerns    PLAN                                                      Outpatient Plan:  Follow up appointment with Primary Care Physician: Faiza Purvis  Follow up appointment with Specialist: Pulmonary, next available.     No future appointments.      For any urgent concerns, please contact our 24 hour nurse triage line: [phone] ([phone])         ALFRED Starr  [phone]  [facility]